# Patient Record
(demographics unavailable — no encounter records)

---

## 2025-03-25 NOTE — PROCEDURE
[Posterior Lesion] : posterior lesion [Anterior rhinoscopy insufficient to account for symptoms] : anterior rhinoscopy insufficient to account for symptoms [Topical Lidocaine] : topical lidocaine [Oxymetazoline HCl] : oxymetazoline HCl [Flexible Endoscope] : examined with the flexible endoscope [Serial Number: ___] : Serial Number: [unfilled] [Allergic] : allergic signs [] : hypertrophy present bilateral [Normal] : the nasopharynx was normal [de-identified] : done for etd to ro npx mass blocking et could not see with speculum [FreeTextEntry6] : a Mucosa: b ar Mucus:b nl Polyps:b nl Inferior turbinate:b nl Middle turbinate:b nl Superior turbinate:b nl Inferior Meatus:b nl Middle Meatus:b nl Superior meatus:b nl Sphenoethmoidal recess:b nl

## 2025-03-25 NOTE — PHYSICAL EXAM
[de-identified] : scant cerumen removed withj curet AD, AS ok. [Nasal Endoscopy Performed] : nasal endoscopy was performed, see procedure section for findings [de-identified] : ar [Midline] : trachea located in midline position [Normal] : no rashes [de-identified] : gait steady

## 2025-03-25 NOTE — DATA REVIEWED
[de-identified] : b symm hf snhl no change from prior 3/2024 - reviewed with pt; r etd on ett; l ok

## 2025-03-25 NOTE — HISTORY OF PRESENT ILLNESS
[de-identified] : 6 mo follow up appt for this 61 yo m with h/o hearing loss. He is interested in amplification, possibly with Air Pods. He reports that he had a recent painful descent on an airplane. He does have h/o inhalant allergy and is taking no meds for it presently. R ear worse than l on descent.

## 2025-03-25 NOTE — REASON FOR VISIT
[Subsequent Evaluation] : a subsequent evaluation for [FreeTextEntry2] : otalgia on landing, nasal obstruction, hearing loss

## 2025-03-25 NOTE — ASSESSMENT
[FreeTextEntry1] : 1) minimal cerumen removed 2) b snhl rec hae - he will consider and call if interested; he said he also may consider AirPod HA 3) ar/etd causing pain on descent - he said this was the first time he had it like this. Flonase (no cataracts or glaucoma), singulair, afrin before flight and again if > 10 hrs; Earplanes rtc 6 mo and as needed